# Patient Record
Sex: FEMALE | Race: WHITE | NOT HISPANIC OR LATINO | Employment: FULL TIME | URBAN - METROPOLITAN AREA
[De-identification: names, ages, dates, MRNs, and addresses within clinical notes are randomized per-mention and may not be internally consistent; named-entity substitution may affect disease eponyms.]

---

## 2023-03-02 ENCOUNTER — OFFICE VISIT (OUTPATIENT)
Dept: INTERNAL MEDICINE | Facility: CLINIC | Age: 53
End: 2023-03-02
Payer: COMMERCIAL

## 2023-03-02 VITALS
SYSTOLIC BLOOD PRESSURE: 104 MMHG | DIASTOLIC BLOOD PRESSURE: 80 MMHG | BODY MASS INDEX: 33.57 KG/M2 | WEIGHT: 171 LBS | HEART RATE: 83 BPM | TEMPERATURE: 98.4 F | OXYGEN SATURATION: 99 % | HEIGHT: 60 IN

## 2023-03-02 DIAGNOSIS — E66.9 OBESITY (BMI 30.0-34.9): ICD-10-CM

## 2023-03-02 DIAGNOSIS — F51.01 PRIMARY INSOMNIA: ICD-10-CM

## 2023-03-02 DIAGNOSIS — I10 PRIMARY HYPERTENSION: Primary | ICD-10-CM

## 2023-03-02 DIAGNOSIS — Z12.11 SCREENING FOR COLON CANCER: ICD-10-CM

## 2023-03-02 PROCEDURE — 99204 OFFICE O/P NEW MOD 45 MIN: CPT | Performed by: INTERNAL MEDICINE

## 2023-03-02 RX ORDER — MEDROXYPROGESTERONE ACETATE 150 MG/ML
150 INJECTION, SUSPENSION INTRAMUSCULAR
COMMUNITY

## 2023-03-02 RX ORDER — ZOLPIDEM TARTRATE 10 MG/1
TABLET ORAL
COMMUNITY
Start: 2023-03-02

## 2023-03-02 RX ORDER — LISINOPRIL AND HYDROCHLOROTHIAZIDE 20; 12.5 MG/1; MG/1
TABLET ORAL
COMMUNITY
Start: 2022-08-23

## 2023-03-02 NOTE — PROGRESS NOTES
"Chief Complaint  [facility], recently recovering from tummy tuck surgery, HTN     Subjective        Kj Yeboah presents to [facility]  History of Present Illness     Ms. Yeboah is a chris 54 yo F who presents to [facility] and discuss HTN    Taking Depo for dysmenorrhea.  April 1st is when this would next be due.  Has confirmed that she is pre-menopausal.  Able to do this at home.     Has two kids a 21 yo and 24 yo.      COVID 19 [employer] in May 2021.    Objective   Vital Signs:  /80 (BP Location: Right arm)   Pulse 83   Temp 98.4 °F (36.9 °C)   Ht 151.1 cm (59.5\")   Wt 77.6 kg (171 lb)   SpO2 99%   BMI 33.96 kg/m²   Estimated body mass index is 33.96 kg/m² as calculated from the following:    Height as of this encounter: 151.1 cm (59.5\").    Weight as of this encounter: 77.6 kg (171 lb).       BMI is >= 30 and <35. (Class 1 Obesity). The following options were offered after discussion;: nutrition counseling/recommendations      Physical Exam  Vitals reviewed.   Constitutional:       General: She is not in acute distress.     Appearance: Normal appearance.   HENT:      Head: Normocephalic and atraumatic.      Nose: Nose normal.      Mouth/Throat:      Mouth: Mucous membranes are moist.   Eyes:      Conjunctiva/sclera: Conjunctivae normal.   Cardiovascular:      Rate and Rhythm: Normal rate and regular rhythm.      Pulses: Normal pulses.      Heart sounds: Normal heart sounds.   Pulmonary:      Effort: Pulmonary effort is normal.      Breath sounds: Normal breath sounds.   Abdominal:      Palpations: Abdomen is soft.      Tenderness: There is no abdominal tenderness.   Musculoskeletal:      Right lower leg: No edema.      Left lower leg: No edema.   Skin:     General: Skin is warm and dry.   Neurological:      General: No focal deficit present.      Mental Status: She is alert.   Psychiatric:         Mood and Affect: Mood normal.        Result Review " :  The following data was reviewed by: Caridad Leiva MD on 03/02/2023:      Data reviewed: reviewed August 2022 labs             Assessment and Plan   Diagnoses and all orders for this visit:    1. Primary hypertension (Primary)  Assessment & Plan:  Hypertension is improving with treatment.  Continue current treatment regimen.  Blood pressure will be reassessed at the next regular appointment.    Orders:  -     Comprehensive Metabolic Panel; Future  -     Lipid Panel With LDL / HDL Ratio; Future  -     Hemoglobin A1c; Future  -     CBC & Differential; Future  -     T4, Free; Future  -     TSH; Future    2. Primary insomnia  Assessment & Plan:  Has been on Zolpidem for some time.  We discussed risks/benefits.  She would like to continue on at this time.  UDS obtained and contract signed today.     Orders:  -     Urine Drug Screen - Urine, Clean Catch  -     Comprehensive Metabolic Panel; Future  -     Lipid Panel With LDL / HDL Ratio; Future  -     Hemoglobin A1c; Future  -     CBC & Differential; Future  -     T4, Free; Future  -     TSH; Future    3. Screening for colon cancer  -     Cancel: Cologuard - Stool, Per Rectum; Future    4. Obesity (BMI 30.0-34.9)  Assessment & Plan:  Patient continuing to recover from surgery with liposuction.  Is also out of what sounds like a difficult relationship.  Will f/u at next visit.     Orders:  -     Comprehensive Metabolic Panel; Future  -     Lipid Panel With LDL / HDL Ratio; Future  -     Hemoglobin A1c; Future           Follow Up   Return in about 6 months (around 9/2/2023).  Patient was given instructions and counseling regarding her condition or for health maintenance advice. Please see specific information pulled into the AVS if appropriate.

## 2023-03-06 PROBLEM — E66.9 OBESITY (BMI 30.0-34.9): Status: ACTIVE | Noted: 2023-03-06

## 2023-03-06 NOTE — ASSESSMENT & PLAN NOTE
Has been on Zolpidem for some time.  We discussed risks/benefits.  She would like to continue on at this time.  S obtained and contract signed today.

## 2023-03-06 NOTE — ASSESSMENT & PLAN NOTE
Patient continuing to recover from surgery with liposuction.  Is also out of what sounds like a difficult relationship.  Will f/u at next visit.

## 2023-03-07 ENCOUNTER — CLINICAL SUPPORT (OUTPATIENT)
Dept: INTERNAL MEDICINE | Facility: CLINIC | Age: 53
End: 2023-03-07
Payer: COMMERCIAL

## 2023-03-09 LAB
AMPHETAMINES UR QL SCN: NEGATIVE NG/ML
BARBITURATES UR QL SCN: NEGATIVE NG/ML
BENZODIAZ UR QL SCN: NEGATIVE NG/ML
BZE UR QL SCN: NEGATIVE NG/ML
CANNABINOIDS UR QL SCN: NEGATIVE NG/ML
CREAT UR-MCNC: 190.8 MG/DL (ref 20–300)
LABORATORY COMMENT REPORT: ABNORMAL
METHADONE UR QL SCN: NEGATIVE NG/ML
OPIATES UR QL SCN: POSITIVE NG/ML
OXYCODONE+OXYMORPHONE UR QL SCN: POSITIVE NG/ML
PCP UR QL: NEGATIVE NG/ML
PH UR: 5.9 [PH] (ref 4.5–8.9)
PROPOXYPH UR QL SCN: NEGATIVE NG/ML